# Patient Record
Sex: FEMALE | Race: BLACK OR AFRICAN AMERICAN | NOT HISPANIC OR LATINO | URBAN - METROPOLITAN AREA
[De-identification: names, ages, dates, MRNs, and addresses within clinical notes are randomized per-mention and may not be internally consistent; named-entity substitution may affect disease eponyms.]

---

## 2018-09-16 ENCOUNTER — EMERGENCY (EMERGENCY)
Facility: HOSPITAL | Age: 23
LOS: 1 days | Discharge: HOME | End: 2018-09-16
Attending: EMERGENCY MEDICINE | Admitting: EMERGENCY MEDICINE
Payer: COMMERCIAL

## 2018-09-16 VITALS
DIASTOLIC BLOOD PRESSURE: 79 MMHG | HEART RATE: 79 BPM | SYSTOLIC BLOOD PRESSURE: 114 MMHG | RESPIRATION RATE: 18 BRPM | OXYGEN SATURATION: 100 % | WEIGHT: 155.43 LBS | TEMPERATURE: 98 F

## 2018-09-16 VITALS
DIASTOLIC BLOOD PRESSURE: 70 MMHG | HEART RATE: 61 BPM | RESPIRATION RATE: 18 BRPM | SYSTOLIC BLOOD PRESSURE: 105 MMHG | TEMPERATURE: 98 F | OXYGEN SATURATION: 100 %

## 2018-09-16 PROCEDURE — 99283 EMERGENCY DEPT VISIT LOW MDM: CPT | Mod: 25

## 2018-09-16 PROCEDURE — 99283 EMERGENCY DEPT VISIT LOW MDM: CPT

## 2018-09-16 RX ORDER — DIPHENHYDRAMINE HCL 50 MG
2 CAPSULE ORAL
Qty: 25 | Refills: 0 | OUTPATIENT
Start: 2018-09-16

## 2018-09-16 RX ORDER — DIPHENHYDRAMINE HCL 50 MG
50 CAPSULE ORAL ONCE
Qty: 0 | Refills: 0 | Status: COMPLETED | OUTPATIENT
Start: 2018-09-16 | End: 2018-09-16

## 2018-09-16 RX ADMIN — Medication 50 MILLIGRAM(S): at 02:44

## 2018-09-16 RX ADMIN — Medication 60 MILLIGRAM(S): at 02:44

## 2018-09-16 NOTE — ED PROVIDER NOTE - PHYSICAL EXAMINATION
GEN: Well appearing, well nourished, awake, alert, oriented to person, place, time/situation and in no apparent distress.  ENT: Airway patent, Nasal mucosa clear. Mouth with normal mucosa. No tongue, lip or pharyngeal swelling, +hoarse voice, no stridor, speakign in full sentences, no drooling.  EYES: Clear bilaterally.  RESPIRATORY: Breathing comfortably with normal RR. No wheezing.  CARDIAC: Regular rate and rhythm  ABDOMEN: Soft, nontender, +bowel sounds, no rebound, rigidity, or guarding.  MSK: Range of motion is not limited, no deformities noted.  NEURO: Alert and oriented, no focal deficits.  SKIN: Skin normal color for race, warm, dry and intact. +eczema noted to chest and ext. No cellulitis, no hives.  PSYCH: Alert and oriented to person, place, time/situation. normal mood and affect. no apparent risk to self or others.

## 2018-09-16 NOTE — ED ADULT NURSE NOTE - CHPI ED NUR SYMPTOMS NEG
no syncope/no weakness/no chills/no loss of consciousness/no nausea/no numbness/no vomiting/no bleeding gums/no fever

## 2018-09-16 NOTE — ED ADULT TRIAGE NOTE - CHIEF COMPLAINT QUOTE
I have a problem with my throat that is feels swollen.  I have had an endoscopy and this has been going of for over a month.  It just feels worse than usual tonight.  I did not eat anything that I am allergic to.

## 2018-09-16 NOTE — ED PROVIDER NOTE - OBJECTIVE STATEMENT
23F with a h/o allergies, eczema, asthma, sore throat and voice hoarseness x 1 month, s/p EGD 2 days ago, awaiting results, who p/w persistent sore throat/throat tightness and hoarse voice that got worse when she was out tonight. She denies eating anything that she is allergic too. No hives, no SOB, no n/v, no other complaints.

## 2018-09-16 NOTE — ED PROVIDER NOTE - MEDICAL DECISION MAKING DETAILS
Pt persistent throat tightness, hoarse voice, has been work up by ENt and GI in the past month, told she might have gerd and that her vocal cords where inflammed. No anaphylaxis on exam, airway patent. No resp distress. WIll treat with pepcid, prednisone and benadryl - pt refuses pepcid bc she is afraid she might be allergic. She was observed and feels better. The patient is stable for DC. They were advised to call their PMD for prompt outpatient follow up. Return precautions were discussed. The patient was advised to return to the ER for any concerning or worsening symptoms.

## 2018-09-16 NOTE — ED ADULT NURSE NOTE - OBJECTIVE STATEMENT
Pt walked into Ed with c/o of difficulty swallowing and acid reflux. Onset has been for a month. She states she gets anxious because of her issues with getting food stuck in her throat due tot he dryness. She is currently being seen by ENT and awaiting results from a biopsy from upper endoscopy. She is currently taking no medication or treatment. Speaking full sentences with open airway, and no drooling noted, redness in the mouth, facial/ tongue swelling. Pt denies having foreign object stuck in throat.

## 2018-09-16 NOTE — ED ADULT NURSE NOTE - NSIMPLEMENTINTERV_GEN_ALL_ED
Implemented All Universal Safety Interventions:  [place] to call system. Call bell, personal items and telephone within reach. Instruct patient to call for assistance. Room bathroom lighting operational. Non-slip footwear when patient is off stretcher. Physically safe environment: no spills, clutter or unnecessary equipment. Stretcher in lowest position, wheels locked, appropriate side rails in place.

## 2018-09-16 NOTE — ED PROVIDER NOTE - CARE PLAN
Principal Discharge DX:	Throat irritation  Secondary Diagnosis:	Environmental allergies  Secondary Diagnosis:	Eczema

## 2018-09-20 DIAGNOSIS — J45.909 UNSPECIFIED ASTHMA, UNCOMPLICATED: ICD-10-CM

## 2018-09-20 DIAGNOSIS — L23.9 ALLERGIC CONTACT DERMATITIS, UNSPECIFIED CAUSE: ICD-10-CM

## 2018-09-20 DIAGNOSIS — R13.10 DYSPHAGIA, UNSPECIFIED: ICD-10-CM

## 2018-09-20 DIAGNOSIS — Z91.010 ALLERGY TO PEANUTS: ICD-10-CM

## 2018-09-20 DIAGNOSIS — Z91.018 ALLERGY TO OTHER FOODS: ICD-10-CM

## 2018-09-20 DIAGNOSIS — J39.2 OTHER DISEASES OF PHARYNX: ICD-10-CM
